# Patient Record
Sex: FEMALE | Race: WHITE | NOT HISPANIC OR LATINO | URBAN - METROPOLITAN AREA
[De-identification: names, ages, dates, MRNs, and addresses within clinical notes are randomized per-mention and may not be internally consistent; named-entity substitution may affect disease eponyms.]

---

## 2024-08-07 ENCOUNTER — APPOINTMENT (OUTPATIENT)
Dept: PRIMARY CARE | Facility: CLINIC | Age: 19
End: 2024-08-07
Payer: COMMERCIAL

## 2024-08-07 VITALS
HEART RATE: 77 BPM | BODY MASS INDEX: 23.14 KG/M2 | SYSTOLIC BLOOD PRESSURE: 126 MMHG | DIASTOLIC BLOOD PRESSURE: 70 MMHG | WEIGHT: 144 LBS | HEIGHT: 66 IN | OXYGEN SATURATION: 98 % | TEMPERATURE: 97.2 F

## 2024-08-07 DIAGNOSIS — T78.40XA ALLERGIC REACTION, INITIAL ENCOUNTER: ICD-10-CM

## 2024-08-07 DIAGNOSIS — Z00.00 ROUTINE GENERAL MEDICAL EXAMINATION AT A HEALTH CARE FACILITY: Primary | ICD-10-CM

## 2024-08-07 PROCEDURE — 99395 PREV VISIT EST AGE 18-39: CPT | Performed by: FAMILY MEDICINE

## 2024-08-07 PROCEDURE — 3008F BODY MASS INDEX DOCD: CPT | Performed by: FAMILY MEDICINE

## 2024-08-07 PROCEDURE — 1036F TOBACCO NON-USER: CPT | Performed by: FAMILY MEDICINE

## 2024-08-07 RX ORDER — NORETHINDRONE ACETATE AND ETHINYL ESTRADIOL AND FERROUS FUMARATE 1MG-20(21)
1 KIT ORAL DAILY
COMMUNITY
Start: 2024-06-11

## 2024-08-07 RX ORDER — EPINEPHRINE 0.3 MG/.3ML
1 INJECTION SUBCUTANEOUS AS NEEDED
Qty: 2 EACH | Refills: 1 | Status: SHIPPED | OUTPATIENT
Start: 2024-08-07 | End: 2025-08-07

## 2024-08-07 RX ORDER — MINOCYCLINE HYDROCHLORIDE 50 MG/1
1 CAPSULE ORAL
COMMUNITY
Start: 2024-07-04

## 2024-08-07 RX ORDER — MINOCYCLINE HYDROCHLORIDE 50 MG/1
50 TABLET ORAL DAILY
COMMUNITY

## 2024-08-07 ASSESSMENT — PATIENT HEALTH QUESTIONNAIRE - PHQ9
1. LITTLE INTEREST OR PLEASURE IN DOING THINGS: NOT AT ALL
SUM OF ALL RESPONSES TO PHQ9 QUESTIONS 1 AND 2: 0
2. FEELING DOWN, DEPRESSED OR HOPELESS: NOT AT ALL

## 2024-08-07 NOTE — PROGRESS NOTES
"Subjective   Patient ID: Socorro Olivas is a 19 y.o. female who presents for New Patient Visit.    HPI annual exam and checkup    Patient presents today to establish care.  Last PCP was . Last saw PCP 1 year ago. [place] about us through her mom    Presents today for annual exam. Does eat a generally healthy diet. Does exercise. Last eye exam was 1 year ago. Last dental exam was 08/01/20254. Is not fasting for BW.  Does not drink a lot of caffeine. Periods are normal.    States she does have to get her wisdom teeth out.     Review of systems  ; Patient seen today for exam denies any problems with headaches or vision, denies any shortness of breath chest pain nausea or vomiting, no black stool no blood in the stool no heartburn type symptoms denies any problems with constipation or diarrhea, and no dysuria-type symptoms    The patient's allergies medications were reviewed with them today    The patient's social family and surgical history or also reviewed here today, along with her past medical history.     Objective     Alert and active in  no acute distress  HEENT TMs clear oropharynx negative nares clear no drainage noted neck supple  With no adenopathy   Heart regular rate and rhythm without murmur and no carotid bruits  Lungs- clear to auscultation bilaterally, no wheeze or rhonchi noted  Thyroid -negative masses or nodularity  Abdomen- soft times four quadrants , bowel sounds positive no masses or organomegaly, negative tenderness guarding or rebound  Neurological exam unremarkable- DTRs in upper and lower extremities within normal limits.   skin -no lesions noted      /70 (BP Location: Left arm, Patient Position: Sitting, BP Cuff Size: Adult)   Pulse 77   Temp 36.2 °C (97.2 °F)   Ht 1.676 m (5' 6\")   Wt 65.3 kg (144 lb)   LMP 08/02/2024   SpO2 98%   BMI 23.24 kg/m²     No Known Allergies    Assessment/Plan   Problem List Items Addressed This Visit    None  Visit Diagnoses       Routine general " medical examination at a health care facility        Relevant Orders    CBC and Auto Differential    Comprehensive Metabolic Panel    Lipid Panel    BMI 23.0-23.9, adult        Allergic reaction, initial encounter              Recommend Peroxide and cotton before showers.     Discussed Meningitis B with her today.   She will discuss this with her parents.   She can have it administered at the office or at any drug store.     Labs have been ordered, she/he will have these performed and we will contact her/him with results.  (CBC, CMP, Lipid)    If anything worsens or changes please call us at once, follow up in the office as planned.    Scribe Attestation  By signing my name below, I, Junie Mock MA, Scribe   attest that this documentation has been prepared under the direction and in the presence of Daniel Pitt DO.

## 2024-08-20 DIAGNOSIS — T78.40XA ALLERGIC REACTION, INITIAL ENCOUNTER: ICD-10-CM

## 2024-08-20 NOTE — TELEPHONE ENCOUNTER
Patient's dad, Pa (on HIPAA) is calling because Socorro saw you on 8/7/24 and was supposed to get a RX for an Epi pen. He states he has gone up to the pharmacy and called and they told him they never received a RX for an Epi pen.   I called the pharmacy and spoke with Aminata. She states that day, 8/7/24 was one of the days that they didn't have power and so that was the reason there was no record of the RX. Would just need to have the RX resent.    Called patient's dad back and made him aware of this.    Aware you are out of the office today    Please resend RX to pharamcy [facility] on [address]

## 2024-08-21 RX ORDER — EPINEPHRINE 0.3 MG/.3ML
1 INJECTION SUBCUTANEOUS AS NEEDED
Qty: 2 EACH | Refills: 1 | Status: SHIPPED | OUTPATIENT
Start: 2024-08-21 | End: 2025-08-21